# Patient Record
(demographics unavailable — no encounter records)

---

## 2024-12-18 NOTE — HISTORY OF PRESENT ILLNESS
[de-identified] : 12/17/24: Mr WANDA ARCE is a 42 y/o male (RHD, ) here today for R shoulder pain since 1/2022. States he was doing push-ups during the pandemic, and his R arm locked at the elbow. States since then he now get intermittent bouts of arm pain if he lifts anything heavier than 2-3 pounds. Has chronic LBP. Denies c-spine pain, or n/t. 'whole arm hurts.' Saw Dr Lynne that did MRI to elbow in 12/2022, and was told 'to live with it.'

## 2024-12-18 NOTE — DISCUSSION/SUMMARY
[de-identified] : 40yo male presents with right elbow arthrofibrosis, limited rom on exam.  1) Instructed to attend PT - discussed possibility of PT not increasing ROM due to length of time with lack of extension  2) Discussed possible repeat MRI in the future if symptoms persist  3) Activity modification  4) RTC in 6-8 weeks